# Patient Record
Sex: MALE | Race: WHITE | Employment: OTHER | URBAN - METROPOLITAN AREA
[De-identification: names, ages, dates, MRNs, and addresses within clinical notes are randomized per-mention and may not be internally consistent; named-entity substitution may affect disease eponyms.]

---

## 2017-01-04 PROCEDURE — 88305 TISSUE EXAM BY PATHOLOGIST: CPT | Performed by: UROLOGY

## 2017-01-11 PROBLEM — C61 PROSTATE CANCER (HCC): Status: ACTIVE | Noted: 2017-01-11

## 2017-01-19 RX ORDER — GLIMEPIRIDE 4 MG/1
4 TABLET ORAL
COMMUNITY
End: 2017-02-27

## 2017-01-26 PROBLEM — I77.9 LEFT-SIDED CAROTID ARTERY DISEASE (HCC): Status: ACTIVE | Noted: 2017-01-26

## 2017-01-26 PROBLEM — I25.810 CORONARY ARTERY DISEASE INVOLVING CORONARY BYPASS GRAFT: Status: ACTIVE | Noted: 2017-01-26

## 2017-01-26 PROBLEM — J44.9 COPD WITHOUT EXACERBATION (HCC): Status: ACTIVE | Noted: 2017-01-26

## 2017-01-26 PROBLEM — E11.9 TYPE 2 DIABETES MELLITUS WITHOUT COMPLICATION (HCC): Status: ACTIVE | Noted: 2017-01-26

## 2017-01-31 ENCOUNTER — APPOINTMENT (OUTPATIENT)
Dept: LAB | Facility: HOSPITAL | Age: 67
End: 2017-01-31
Payer: MEDICARE

## 2017-01-31 ENCOUNTER — LAB ENCOUNTER (OUTPATIENT)
Dept: LAB | Facility: HOSPITAL | Age: 67
End: 2017-01-31
Payer: MEDICARE

## 2017-01-31 DIAGNOSIS — C61 PROSTATE CANCER (HCC): ICD-10-CM

## 2017-01-31 LAB
ANTIBODY SCREEN: NEGATIVE
ATRIAL RATE: 51 BPM
BASOPHILS # BLD AUTO: 0.08 X10(3) UL (ref 0–0.1)
BASOPHILS NFR BLD AUTO: 1.1 %
BUN BLD-MCNC: 12 MG/DL (ref 8–20)
CALCIUM BLD-MCNC: 8.7 MG/DL (ref 8.3–10.3)
CHLORIDE: 103 MMOL/L (ref 101–111)
CO2: 30 MMOL/L (ref 22–32)
CREAT BLD-MCNC: 0.94 MG/DL (ref 0.7–1.3)
EOSINOPHIL # BLD AUTO: 0.41 X10(3) UL (ref 0–0.3)
EOSINOPHIL NFR BLD AUTO: 5.7 %
ERYTHROCYTE [DISTWIDTH] IN BLOOD BY AUTOMATED COUNT: 12.7 % (ref 11.5–16)
GLUCOSE BLD-MCNC: 194 MG/DL (ref 70–99)
HCT VFR BLD AUTO: 40.8 % (ref 37–53)
HGB BLD-MCNC: 14.3 G/DL (ref 13–17)
IMMATURE GRANULOCYTE COUNT: 0.05 X10(3) UL (ref 0–1)
IMMATURE GRANULOCYTE RATIO %: 0.7 %
LYMPHOCYTES # BLD AUTO: 2.3 X10(3) UL (ref 0.9–4)
LYMPHOCYTES NFR BLD AUTO: 31.8 %
MCH RBC QN AUTO: 29.9 PG (ref 27–33.2)
MCHC RBC AUTO-ENTMCNC: 35 G/DL (ref 31–37)
MCV RBC AUTO: 85.4 FL (ref 80–99)
MONOCYTES # BLD AUTO: 0.77 X10(3) UL (ref 0.1–0.6)
MONOCYTES NFR BLD AUTO: 10.6 %
NEUTROPHIL ABS PRELIM: 3.63 X10 (3) UL (ref 1.3–6.7)
NEUTROPHILS # BLD AUTO: 3.63 X10(3) UL (ref 1.3–6.7)
NEUTROPHILS NFR BLD AUTO: 50.1 %
P AXIS: 54 DEGREES
P-R INTERVAL: 168 MS
PLATELET # BLD AUTO: 242 10(3)UL (ref 150–450)
POTASSIUM SERPL-SCNC: 4.4 MMOL/L (ref 3.6–5.1)
Q-T INTERVAL: 466 MS
QRS DURATION: 96 MS
QTC CALCULATION (BEZET): 429 MS
R AXIS: -23 DEGREES
RBC # BLD AUTO: 4.78 X10(6)UL (ref 3.8–5.8)
RED CELL DISTRIBUTION WIDTH-SD: 39.3 FL (ref 35.1–46.3)
RH BLOOD TYPE: NEGATIVE
SODIUM SERPL-SCNC: 140 MMOL/L (ref 136–144)
T AXIS: -36 DEGREES
VENTRICULAR RATE: 51 BPM
WBC # BLD AUTO: 7.2 X10(3) UL (ref 4–13)

## 2017-01-31 PROCEDURE — 93005 ELECTROCARDIOGRAM TRACING: CPT

## 2017-01-31 PROCEDURE — 86901 BLOOD TYPING SEROLOGIC RH(D): CPT

## 2017-01-31 PROCEDURE — 86850 RBC ANTIBODY SCREEN: CPT

## 2017-01-31 PROCEDURE — 36415 COLL VENOUS BLD VENIPUNCTURE: CPT

## 2017-01-31 PROCEDURE — 80048 BASIC METABOLIC PNL TOTAL CA: CPT

## 2017-01-31 PROCEDURE — 93010 ELECTROCARDIOGRAM REPORT: CPT | Performed by: INTERNAL MEDICINE

## 2017-01-31 PROCEDURE — 86900 BLOOD TYPING SEROLOGIC ABO: CPT

## 2017-01-31 PROCEDURE — 85025 COMPLETE CBC W/AUTO DIFF WBC: CPT

## 2017-02-16 ENCOUNTER — ANESTHESIA EVENT (OUTPATIENT)
Dept: SURGERY | Facility: HOSPITAL | Age: 67
DRG: 707 | End: 2017-02-16
Payer: MEDICARE

## 2017-02-17 ENCOUNTER — SURGERY (OUTPATIENT)
Age: 67
End: 2017-02-17

## 2017-02-17 ENCOUNTER — ANESTHESIA (OUTPATIENT)
Dept: SURGERY | Facility: HOSPITAL | Age: 67
DRG: 707 | End: 2017-02-17
Payer: MEDICARE

## 2017-02-17 ENCOUNTER — HOSPITAL ENCOUNTER (INPATIENT)
Facility: HOSPITAL | Age: 67
LOS: 2 days | Discharge: HOME | DRG: 707 | End: 2017-02-19
Attending: UROLOGY | Admitting: UROLOGY
Payer: MEDICARE

## 2017-02-17 DIAGNOSIS — C61 PROSTATE CANCER (HCC): Primary | ICD-10-CM

## 2017-02-17 LAB
ERYTHROCYTE [DISTWIDTH] IN BLOOD BY AUTOMATED COUNT: 12.9 % (ref 11.5–16)
GLUCOSE BLD-MCNC: 195 MG/DL (ref 65–99)
GLUCOSE BLD-MCNC: 261 MG/DL (ref 65–99)
GLUCOSE BLD-MCNC: 283 MG/DL (ref 65–99)
GLUCOSE BLD-MCNC: 283 MG/DL (ref 65–99)
GLUCOSE BLD-MCNC: 287 MG/DL (ref 65–99)
HCT VFR BLD AUTO: 36.7 % (ref 37–53)
HGB BLD-MCNC: 12.7 G/DL (ref 13–17)
MCH RBC QN AUTO: 30 PG (ref 27–33.2)
MCHC RBC AUTO-ENTMCNC: 34.6 G/DL (ref 31–37)
MCV RBC AUTO: 86.6 FL (ref 80–99)
PLATELET # BLD AUTO: 221 10(3)UL (ref 150–450)
RBC # BLD AUTO: 4.24 X10(6)UL (ref 3.8–5.8)
RED CELL DISTRIBUTION WIDTH-SD: 40 FL (ref 35.1–46.3)
WBC # BLD AUTO: 15 X10(3) UL (ref 4–13)

## 2017-02-17 PROCEDURE — 0VBQ4ZZ EXCISION OF BILATERAL VAS DEFERENS, PERCUTANEOUS ENDOSCOPIC APPROACH: ICD-10-PCS | Performed by: UROLOGY

## 2017-02-17 PROCEDURE — 8E0W4CZ ROBOTIC ASSISTED PROCEDURE OF TRUNK REGION, PERCUTANEOUS ENDOSCOPIC APPROACH: ICD-10-PCS | Performed by: UROLOGY

## 2017-02-17 PROCEDURE — 88307 TISSUE EXAM BY PATHOLOGIST: CPT | Performed by: UROLOGY

## 2017-02-17 PROCEDURE — 0VT04ZZ RESECTION OF PROSTATE, PERCUTANEOUS ENDOSCOPIC APPROACH: ICD-10-PCS | Performed by: UROLOGY

## 2017-02-17 PROCEDURE — 88305 TISSUE EXAM BY PATHOLOGIST: CPT | Performed by: UROLOGY

## 2017-02-17 PROCEDURE — 0VT34ZZ RESECTION OF BILATERAL SEMINAL VESICLES, PERCUTANEOUS ENDOSCOPIC APPROACH: ICD-10-PCS | Performed by: UROLOGY

## 2017-02-17 PROCEDURE — 07BC4ZX EXCISION OF PELVIS LYMPHATIC, PERCUTANEOUS ENDOSCOPIC APPROACH, DIAGNOSTIC: ICD-10-PCS | Performed by: UROLOGY

## 2017-02-17 PROCEDURE — 82962 GLUCOSE BLOOD TEST: CPT

## 2017-02-17 PROCEDURE — 88309 TISSUE EXAM BY PATHOLOGIST: CPT | Performed by: UROLOGY

## 2017-02-17 PROCEDURE — 85027 COMPLETE CBC AUTOMATED: CPT | Performed by: UROLOGY

## 2017-02-17 DEVICE — SURGIFLO HEMOSTATIC MATRIX KIT: Type: IMPLANTABLE DEVICE | Site: PELVIS | Status: FUNCTIONAL

## 2017-02-17 RX ORDER — SODIUM CHLORIDE, SODIUM LACTATE, POTASSIUM CHLORIDE, CALCIUM CHLORIDE 600; 310; 30; 20 MG/100ML; MG/100ML; MG/100ML; MG/100ML
INJECTION, SOLUTION INTRAVENOUS CONTINUOUS
Status: DISCONTINUED | OUTPATIENT
Start: 2017-02-17 | End: 2017-02-17 | Stop reason: HOSPADM

## 2017-02-17 RX ORDER — POLYETHYLENE GLYCOL 3350 17 G/17G
17 POWDER, FOR SOLUTION ORAL DAILY PRN
Status: DISCONTINUED | OUTPATIENT
Start: 2017-02-17 | End: 2017-02-19

## 2017-02-17 RX ORDER — ATENOLOL 50 MG/1
50 TABLET ORAL
Status: DISCONTINUED | OUTPATIENT
Start: 2017-02-18 | End: 2017-02-19

## 2017-02-17 RX ORDER — INSULIN ASPART 100 [IU]/ML
INJECTION, SOLUTION INTRAVENOUS; SUBCUTANEOUS
Status: COMPLETED
Start: 2017-02-17 | End: 2017-02-17

## 2017-02-17 RX ORDER — BUPIVACAINE HYDROCHLORIDE 5 MG/ML
INJECTION, SOLUTION EPIDURAL; INTRACAUDAL AS NEEDED
Status: DISCONTINUED | OUTPATIENT
Start: 2017-02-17 | End: 2017-02-17 | Stop reason: HOSPADM

## 2017-02-17 RX ORDER — SODIUM CHLORIDE, SODIUM LACTATE, POTASSIUM CHLORIDE, CALCIUM CHLORIDE 600; 310; 30; 20 MG/100ML; MG/100ML; MG/100ML; MG/100ML
INJECTION, SOLUTION INTRAVENOUS CONTINUOUS
Status: DISCONTINUED | OUTPATIENT
Start: 2017-02-17 | End: 2017-02-17

## 2017-02-17 RX ORDER — METOCLOPRAMIDE HYDROCHLORIDE 5 MG/ML
10 INJECTION INTRAMUSCULAR; INTRAVENOUS AS NEEDED
Status: DISCONTINUED | OUTPATIENT
Start: 2017-02-17 | End: 2017-02-17 | Stop reason: HOSPADM

## 2017-02-17 RX ORDER — ONDANSETRON 2 MG/ML
4 INJECTION INTRAMUSCULAR; INTRAVENOUS AS NEEDED
Status: DISCONTINUED | OUTPATIENT
Start: 2017-02-17 | End: 2017-02-17 | Stop reason: HOSPADM

## 2017-02-17 RX ORDER — ROSUVASTATIN CALCIUM 20 MG/1
20 TABLET, COATED ORAL NIGHTLY
Status: DISCONTINUED | OUTPATIENT
Start: 2017-02-17 | End: 2017-02-19

## 2017-02-17 RX ORDER — ZOLPIDEM TARTRATE 5 MG/1
5 TABLET ORAL NIGHTLY PRN
Status: DISCONTINUED | OUTPATIENT
Start: 2017-02-17 | End: 2017-02-19

## 2017-02-17 RX ORDER — ONDANSETRON 2 MG/ML
4 INJECTION INTRAMUSCULAR; INTRAVENOUS EVERY 6 HOURS PRN
Status: DISCONTINUED | OUTPATIENT
Start: 2017-02-17 | End: 2017-02-19

## 2017-02-17 RX ORDER — GLIPIZIDE 5 MG/1
10 TABLET ORAL
Status: DISCONTINUED | OUTPATIENT
Start: 2017-02-17 | End: 2017-02-17

## 2017-02-17 RX ORDER — HYDROCODONE BITARTRATE AND ACETAMINOPHEN 5; 325 MG/1; MG/1
2 TABLET ORAL EVERY 4 HOURS PRN
Status: DISCONTINUED | OUTPATIENT
Start: 2017-02-17 | End: 2017-02-19

## 2017-02-17 RX ORDER — HYDROMORPHONE HYDROCHLORIDE 1 MG/ML
INJECTION, SOLUTION INTRAMUSCULAR; INTRAVENOUS; SUBCUTANEOUS
Status: COMPLETED
Start: 2017-02-17 | End: 2017-02-17

## 2017-02-17 RX ORDER — DOCUSATE SODIUM 100 MG/1
100 CAPSULE, LIQUID FILLED ORAL 2 TIMES DAILY
Status: DISCONTINUED | OUTPATIENT
Start: 2017-02-17 | End: 2017-02-19

## 2017-02-17 RX ORDER — MEPERIDINE HYDROCHLORIDE 25 MG/ML
12.5 INJECTION INTRAMUSCULAR; INTRAVENOUS; SUBCUTANEOUS AS NEEDED
Status: DISCONTINUED | OUTPATIENT
Start: 2017-02-17 | End: 2017-02-17 | Stop reason: HOSPADM

## 2017-02-17 RX ORDER — SODIUM CHLORIDE 9 MG/ML
INJECTION, SOLUTION INTRAVENOUS CONTINUOUS
Status: DISCONTINUED | OUTPATIENT
Start: 2017-02-17 | End: 2017-02-19

## 2017-02-17 RX ORDER — HYDROMORPHONE HYDROCHLORIDE 1 MG/ML
0.2 INJECTION, SOLUTION INTRAMUSCULAR; INTRAVENOUS; SUBCUTANEOUS EVERY 2 HOUR PRN
Status: DISCONTINUED | OUTPATIENT
Start: 2017-02-17 | End: 2017-02-19

## 2017-02-17 RX ORDER — ACETAMINOPHEN 325 MG/1
650 TABLET ORAL EVERY 4 HOURS PRN
Status: DISCONTINUED | OUTPATIENT
Start: 2017-02-17 | End: 2017-02-19

## 2017-02-17 RX ORDER — MAGNESIUM HYDROXIDE 1200 MG/15ML
LIQUID ORAL CONTINUOUS PRN
Status: DISCONTINUED | OUTPATIENT
Start: 2017-02-17 | End: 2017-02-17

## 2017-02-17 RX ORDER — HYDROMORPHONE HYDROCHLORIDE 1 MG/ML
0.4 INJECTION, SOLUTION INTRAMUSCULAR; INTRAVENOUS; SUBCUTANEOUS EVERY 5 MIN PRN
Status: DISCONTINUED | OUTPATIENT
Start: 2017-02-17 | End: 2017-02-17 | Stop reason: HOSPADM

## 2017-02-17 RX ORDER — INSULIN ASPART 100 [IU]/ML
INJECTION, SOLUTION INTRAVENOUS; SUBCUTANEOUS ONCE
Status: COMPLETED | OUTPATIENT
Start: 2017-02-17 | End: 2017-02-17

## 2017-02-17 RX ORDER — HEPARIN SODIUM 5000 [USP'U]/ML
5000 INJECTION, SOLUTION INTRAVENOUS; SUBCUTANEOUS ONCE
Status: COMPLETED | OUTPATIENT
Start: 2017-02-17 | End: 2017-02-17

## 2017-02-17 RX ORDER — HYDROCODONE BITARTRATE AND ACETAMINOPHEN 5; 325 MG/1; MG/1
1 TABLET ORAL EVERY 4 HOURS PRN
Status: DISCONTINUED | OUTPATIENT
Start: 2017-02-17 | End: 2017-02-19

## 2017-02-17 RX ORDER — ACETAMINOPHEN 325 MG/1
650 TABLET ORAL EVERY 6 HOURS PRN
Status: DISCONTINUED | OUTPATIENT
Start: 2017-02-17 | End: 2017-02-17

## 2017-02-17 RX ORDER — FAMOTIDINE 20 MG/1
20 TABLET ORAL 2 TIMES DAILY
Status: DISCONTINUED | OUTPATIENT
Start: 2017-02-17 | End: 2017-02-19

## 2017-02-17 RX ORDER — ONDANSETRON 4 MG/1
4 TABLET, FILM COATED ORAL EVERY 6 HOURS PRN
Status: DISCONTINUED | OUTPATIENT
Start: 2017-02-17 | End: 2017-02-19

## 2017-02-17 RX ORDER — NALOXONE HYDROCHLORIDE 0.4 MG/ML
80 INJECTION, SOLUTION INTRAMUSCULAR; INTRAVENOUS; SUBCUTANEOUS AS NEEDED
Status: DISCONTINUED | OUTPATIENT
Start: 2017-02-17 | End: 2017-02-17 | Stop reason: HOSPADM

## 2017-02-17 RX ORDER — ISOSORBIDE MONONITRATE 30 MG/1
30 TABLET, EXTENDED RELEASE ORAL DAILY
Status: DISCONTINUED | OUTPATIENT
Start: 2017-02-18 | End: 2017-02-19

## 2017-02-17 RX ORDER — HYDROMORPHONE HYDROCHLORIDE 1 MG/ML
0.8 INJECTION, SOLUTION INTRAMUSCULAR; INTRAVENOUS; SUBCUTANEOUS EVERY 2 HOUR PRN
Status: DISCONTINUED | OUTPATIENT
Start: 2017-02-17 | End: 2017-02-19

## 2017-02-17 RX ORDER — MIDAZOLAM HYDROCHLORIDE 1 MG/ML
1 INJECTION INTRAMUSCULAR; INTRAVENOUS EVERY 5 MIN PRN
Status: DISCONTINUED | OUTPATIENT
Start: 2017-02-17 | End: 2017-02-17 | Stop reason: HOSPADM

## 2017-02-17 RX ORDER — DEXTROSE MONOHYDRATE 25 G/50ML
50 INJECTION, SOLUTION INTRAVENOUS
Status: DISCONTINUED | OUTPATIENT
Start: 2017-02-17 | End: 2017-02-19

## 2017-02-17 RX ORDER — DEXTROSE MONOHYDRATE 25 G/50ML
50 INJECTION, SOLUTION INTRAVENOUS
Status: DISCONTINUED | OUTPATIENT
Start: 2017-02-17 | End: 2017-02-17 | Stop reason: HOSPADM

## 2017-02-17 RX ORDER — ENOXAPARIN SODIUM 100 MG/ML
40 INJECTION SUBCUTANEOUS DAILY
Status: DISCONTINUED | OUTPATIENT
Start: 2017-02-17 | End: 2017-02-19

## 2017-02-17 RX ORDER — GLIMEPIRIDE 2 MG/1
4 TABLET ORAL
Status: DISCONTINUED | OUTPATIENT
Start: 2017-02-18 | End: 2017-02-17

## 2017-02-17 RX ORDER — HYDROMORPHONE HYDROCHLORIDE 1 MG/ML
0.4 INJECTION, SOLUTION INTRAMUSCULAR; INTRAVENOUS; SUBCUTANEOUS EVERY 2 HOUR PRN
Status: DISCONTINUED | OUTPATIENT
Start: 2017-02-17 | End: 2017-02-19

## 2017-02-17 NOTE — ANESTHESIA POSTPROCEDURE EVALUATION
[id number] Patient Status:  Surgery Admit   Age/Gender 78year old male MRN [id number]   Location [facility] Attending Tian Hitchcock MD   Hosp Day # 0 PCP Bonita Marcos MD

## 2017-02-17 NOTE — ANESTHESIA PREPROCEDURE EVALUATION
Late entry due to patient care and phone calls between consultants.     PRE-OP EVALUATION    Patient Name: Sylvester Ruby    Pre-op Diagnosis: PROSTATE CANCER      Procedure(s):  ROBOTIC ASSISTED LAPAROSCOPIC RADICAL PROSTATECTOMY WITH PELVIC LYM Tablet 24 Hr  Disp:  Rfl:    [DISCONTINUED] MetFORMIN HCl 500 MG Oral Tab Take 1,000 mg by mouth 2 (two) times daily with meals. Disp:  Rfl:        Allergies: Review of patient's allergies indicates no known allergies.       Anesthesia Evaluation    Patie x 5     CABG  1992    Comment CABG x 4    OTHER      Comment Left Carotid Endarectomy    HERNIA SURGERY          Smoking status: Former Smoker  1.00 Packs/Day  For 30.00 Years     Quit date: 12/28/2011    Smokeless tobacco: Never Used    Alcohol Use: Yes Discussed risk of oral/dental trauma, nausea, sore throat, rare allergic reactions. Other options for anesthesia were mentioned and a plan was agreed upon for general anesthesia.   Patient understands the risks and requirement for anesthesia and agrees to

## 2017-02-17 NOTE — PLAN OF CARE
Problem: GENITOURINARY - ADULT  Goal: Absence of urinary retention  INTERVENTIONS:  - Assess patient’s ability to void and empty bladder  - Monitor intake/output and perform bladder scan as needed  - Follow urinary retention protocol/standard of care  - Co

## 2017-02-17 NOTE — CONSULTS
General Medicine Consult      Reason for consult: post-op medical management     Consulted by: Dr. Winifred Chun    PCP: Tejas Paul MD      History of Present Illness: Patient is a 78year old male with PMH sig for DMII, HTN, HL, CVA, prostate CA meals.   Disp:  Rfl:        Scheduled Medication:  • [START ON 2/18/2017] glimepiride  4 mg Oral QAM AC   • [START ON 2/18/2017] atenolol  50 mg Oral Daily Beta Blocker   • glipiZIDE  10 mg Oral BID AC   • SITagliptin Phosphate  100 mg Oral Daily   • [STAR +BS  Ext: nonedematous b/l LE   Neuro: moving all extremities  Psych: normal mood, normal affect  Skin: no rashes, no lesions      Diagnostics:   CBC/Chem  Recent Labs   Lab  02/17/17   1134   WBC  15.0*   HGB  12.7*   MCV  86.6   PLT  221.0       No resul INTIMA-MEDIA THICKNESS = 1 mm Comment: Physiologic color flow and doppler waveforms. There is mild calcified hyperechoic atherosclerotic plaque seen measuring 6 mm. No grayscale evidence of luminal stenosis.    VERTEBRAL ARTERIES Direction of flow on right: [phone]      **Certification        PHYSICIAN Certification of Need for Inpatient Hospitalization - Initial Certification      Patient will require inpatient services that will reasonably be expected to span two midnight's based on the clinical docume

## 2017-02-17 NOTE — OPERATIVE REPORT
[facility]rology       Operative Note    Ace Damme Patient Status:  Surgery Admit    9/15/1950 MRN [id number]   Location [facility] Attending Komal Patel MD   Hosp Day # 0 PCP Margret Villalobos procedure were identified in the presence of Nursing, Anesthesia, and surgical staff. After the placement of lines and monitors, general anesthesia was induced. Prophylactic antibiotics were administered.   Sequential compressive devices were placed on mary posterior bladder neck fibers down to the level of the vasa deferentia and seminal vesicles. The vasa deferentia were freed from their surrounding attachments through blunt and sharp dissection and were divided after control with the bipolar cautery.   The proximal limit of dissection, monopolar cautery was used to control the lymphatic vessels. Both the right and left lymph node packages were then placed in an EndoCatch bag for removal at the end of the procedure.     We then performed urethrovesical anasto

## 2017-02-17 NOTE — PROGRESS NOTES
New orders noted from dr Lesvia Jim:  'Please ask urology if its ok to resume plavix in AM (pt has h/o CAD, CVA)'

## 2017-02-17 NOTE — H&P
[facility] Urology  H&P Note    Cande Cat Patient Status:  Surgery Admit    9/15/1950 MRN [id number]   Location [address] [place] [address] Attending Patricia Dennis MD   [facility] Day # 0 PCP Rico Weller **OR** glucose (DEX4) oral liquid 30 g, 30 g, Oral, Q15 Min PRN **OR** Glucose-Vitamin C (DEX-4) 4-0.006 g chewable tab 8 tablet, 8 tablet, Oral, Q15 Min PRN  •  Heparin Sodium (Porcine) 5000 UNIT/ML injection 5,000 Units, 5,000 Units, Subcutaneous, Once

## 2017-02-18 LAB
BUN BLD-MCNC: 7 MG/DL (ref 8–20)
CALCIUM BLD-MCNC: 7.2 MG/DL (ref 8.3–10.3)
CHLORIDE: 107 MMOL/L (ref 101–111)
CO2: 25 MMOL/L (ref 22–32)
CREAT BLD-MCNC: 0.87 MG/DL (ref 0.7–1.3)
CREATININE, OTHER BODY FLUID: 0.86 MG/DL
ERYTHROCYTE [DISTWIDTH] IN BLOOD BY AUTOMATED COUNT: 13 % (ref 11.5–16)
GLUCOSE BLD-MCNC: 163 MG/DL (ref 70–99)
GLUCOSE BLD-MCNC: 166 MG/DL (ref 65–99)
GLUCOSE BLD-MCNC: 184 MG/DL (ref 65–99)
GLUCOSE BLD-MCNC: 207 MG/DL (ref 65–99)
GLUCOSE BLD-MCNC: 219 MG/DL (ref 65–99)
GLUCOSE BLD-MCNC: 234 MG/DL (ref 65–99)
HCT VFR BLD AUTO: 30.8 % (ref 37–53)
HGB BLD-MCNC: 10.5 G/DL (ref 13–17)
MCH RBC QN AUTO: 29.7 PG (ref 27–33.2)
MCHC RBC AUTO-ENTMCNC: 34.1 G/DL (ref 31–37)
MCV RBC AUTO: 87 FL (ref 80–99)
PLATELET # BLD AUTO: 185 10(3)UL (ref 150–450)
POTASSIUM SERPL-SCNC: 3.6 MMOL/L (ref 3.6–5.1)
POTASSIUM SERPL-SCNC: 4.5 MMOL/L (ref 3.6–5.1)
RBC # BLD AUTO: 3.54 X10(6)UL (ref 3.8–5.8)
RED CELL DISTRIBUTION WIDTH-SD: 41 FL (ref 35.1–46.3)
SODIUM SERPL-SCNC: 140 MMOL/L (ref 136–144)
WBC # BLD AUTO: 8.7 X10(3) UL (ref 4–13)

## 2017-02-18 PROCEDURE — 82570 ASSAY OF URINE CREATININE: CPT | Performed by: UROLOGY

## 2017-02-18 PROCEDURE — 82962 GLUCOSE BLOOD TEST: CPT

## 2017-02-18 PROCEDURE — 80048 BASIC METABOLIC PNL TOTAL CA: CPT | Performed by: UROLOGY

## 2017-02-18 PROCEDURE — 85027 COMPLETE CBC AUTOMATED: CPT | Performed by: UROLOGY

## 2017-02-18 PROCEDURE — 84132 ASSAY OF SERUM POTASSIUM: CPT | Performed by: INTERNAL MEDICINE

## 2017-02-18 RX ORDER — POTASSIUM CHLORIDE 20 MEQ/1
40 TABLET, EXTENDED RELEASE ORAL EVERY 4 HOURS
Status: COMPLETED | OUTPATIENT
Start: 2017-02-18 | End: 2017-02-18

## 2017-02-18 NOTE — PLAN OF CARE
PT A/O, 93% ON RA, SCDS ON, LAP SITES X6, D/I, ELLIOTT X1 DRAINING SEROUSANGUINOUS DRAINAGE, MINOR DRAINING CL YELLOW URINE, TEMP 100.9, GIVEN NORCO, IVF INFUISNG , LABS THIS AM. USING IS.  GENITOURINARY - ADULT    • Absence of urinary retention Progressing

## 2017-02-18 NOTE — PROGRESS NOTES
[facility]  Urology Progress Note    Watson De La Fuente Patient Status:  Inpatient    9/15/1950 MRN [id number]   [facility]NW-A Attending Star Beltran MD   Hosp Day # 1 PCP Ju Huerta MD     Assessment: Posto

## 2017-02-18 NOTE — PAYOR COMM NOTE
Attending Physician: Star Beltran MD    Review Type: ADMISSION   Reviewer: Josué Hart       Date: February 18, 2017 - 12:11 PM  Payor: Joanne Lilly MEDICARE ADV PPO  Authorization Number: [id number]/A  Admit date: 2/17/2017  6:13 AM   Admitted from Emergency Dept. 2/18/2017 0919 Given 40 mg Subcutaneous (Right Upper Abdomen) Sabas Anna RN    2/17/2017 2153 Given 40 mg Subcutaneous (Left Lower Abdomen) Pan Mcginnis, RN      famoTIDine (PEPCID) tab 20 mg     Date Action Dose Route User    2/18/2017 0916 Gi 2/17/2017 1904 Given 4 Units Subcutaneous (Right Upper Arm) Fawadsilviano Young, RN      Potassium Chloride ER (K-DUR M20) CR tab 40 mEq     Date Action Dose Route User    2/18/2017 0915 Given 40 mEq Oral Fawadsilviano Young, RN      Rosuvastatin Calcium (CREST POCT GLUCOSE - Abnormal; Notable for the following:     POC Glucose 166 (*)     All other components within normal limits   POCT GLUCOSE - Abnormal; Notable for the following:     POC Glucose 184 (*)     All other components within normal limits   POCT GLU

## 2017-02-18 NOTE — PROGRESS NOTES
Spoke with dr Joesph Jay. Informed of arcelia drain creat levels. New orders noted to d/c drain tonight.   md states plan d/c home tomorrow

## 2017-02-18 NOTE — PLAN OF CARE
Problem: Patient/Family Goals  Goal: Patient/Family Short Term Goal  Patient’s Short Term Goal: 'i want to get better'    Interventions:   - norco given as ordered  - See additional Care Plan goals for specific interventions   Outcome: Progressing  Pt stat new pain   Outcome: Progressing  Pt states pain is currently tolerable    Problem: Diabetes/Glucose Control  Goal: Glucose maintained within prescribed range  INTERVENTIONS:  - Monitor Blood Glucose as ordered  - Assess for signs and symptoms of hyperglyce

## 2017-02-18 NOTE — PROGRESS NOTES
[facility] Hospitalist Progress Note                                                                   [address]  9/15/1950    SUBJECTIVE: pt denies cp, sob and cough.  Pain controll Glucose-Vitamin C **OR** dextrose **OR** glucose **OR** Glucose-Vitamin C    Assessment/Plan:  Active Problems:    [facility]        # Prostate CA s/p prostatectomy  - per primary service    #Fever  -monitor for evidence of infection    # DMII,

## 2017-02-18 NOTE — PROGRESS NOTES
02/17/17 1828   Clinical Encounter Type   Visited With Patient not available  (RN checked with patient who says he does not want a  visit at this time)

## 2017-02-19 VITALS
HEIGHT: 65 IN | BODY MASS INDEX: 25.83 KG/M2 | DIASTOLIC BLOOD PRESSURE: 68 MMHG | SYSTOLIC BLOOD PRESSURE: 101 MMHG | HEART RATE: 68 BPM | TEMPERATURE: 99 F | WEIGHT: 155 LBS | RESPIRATION RATE: 18 BRPM | OXYGEN SATURATION: 97 %

## 2017-02-19 LAB
ERYTHROCYTE [DISTWIDTH] IN BLOOD BY AUTOMATED COUNT: 12.8 % (ref 11.5–16)
GLUCOSE BLD-MCNC: 146 MG/DL (ref 65–99)
GLUCOSE BLD-MCNC: 221 MG/DL (ref 65–99)
HCT VFR BLD AUTO: 26.2 % (ref 37–53)
HGB BLD-MCNC: 9 G/DL (ref 13–17)
HGB BLD-MCNC: 9.9 G/DL (ref 13–17)
MCH RBC QN AUTO: 29.7 PG (ref 27–33.2)
MCHC RBC AUTO-ENTMCNC: 34.4 G/DL (ref 31–37)
MCV RBC AUTO: 86.5 FL (ref 80–99)
PLATELET # BLD AUTO: 188 10(3)UL (ref 150–450)
RBC # BLD AUTO: 3.03 X10(6)UL (ref 3.8–5.8)
RED CELL DISTRIBUTION WIDTH-SD: 40.3 FL (ref 35.1–46.3)
WBC # BLD AUTO: 8.3 X10(3) UL (ref 4–13)

## 2017-02-19 PROCEDURE — 85027 COMPLETE CBC AUTOMATED: CPT | Performed by: UROLOGY

## 2017-02-19 PROCEDURE — 85018 HEMOGLOBIN: CPT | Performed by: UROLOGY

## 2017-02-19 PROCEDURE — 82962 GLUCOSE BLOOD TEST: CPT

## 2017-02-19 RX ORDER — METOCLOPRAMIDE 10 MG/1
10 TABLET ORAL ONCE
Status: COMPLETED | OUTPATIENT
Start: 2017-02-19 | End: 2017-02-19

## 2017-02-19 RX ORDER — METOCLOPRAMIDE 10 MG/1
10 TABLET ORAL
Qty: 6 TABLET | Refills: 0 | Status: SHIPPED | OUTPATIENT
Start: 2017-02-19 | End: 2017-02-21

## 2017-02-19 RX ORDER — BISACODYL 10 MG
10 SUPPOSITORY, RECTAL RECTAL ONCE
Status: COMPLETED | OUTPATIENT
Start: 2017-02-19 | End: 2017-02-19

## 2017-02-19 NOTE — PROGRESS NOTES
[facility]  Urology Progress Note    Travis Quiñonez Patient Status:  Inpatient    9/15/1950 MRN [id number]   [facility] 3NW-A Attending Hany Tiwari MD   Hosp Day # 2 PCP Michelle Etienne MD     Assessment: Posto

## 2017-02-19 NOTE — PLAN OF CARE
Pt and pt's wife given all discharge instructions and necessary supplies. ELLIOTT site dressing changed just before DC; old dressing noted to be saturated with serosanguinous drainage. Hills bag switched to leg bag.   Pt and pt's wife stated understanding and

## 2017-02-19 NOTE — PROGRESS NOTES
Spoke with dr Brayan Stuart. Informed md of pt's concern that a couple of drips of fluid came from cath insertion site. md states that is to be expected.   Also informed md of cbc results & pt stating that he has not passed gas but did have small very light bm @

## 2017-02-19 NOTE — PROGRESS NOTES
[facility] Hospitalist Progress Note                                                                   [address]  9/15/1950    SUBJECTIVE: Pt trying to use bathroom, no flatus.  NO f acetaminophen **OR** HYDROcodone-acetaminophen **OR** HYDROcodone-acetaminophen, HYDROmorphone HCl PF **OR** HYDROmorphone HCl PF **OR** HYDROmorphone HCl PF, Zolpidem Tartrate, PEG 3350, ondansetron HCl **OR** Ondansetron HCl, CEPACOL, glucose **OR** Gluc

## 2017-02-19 NOTE — PLAN OF CARE
Problem: Patient/Family Goals  Goal: Patient/Family Short Term Goal  Patient’s Short Term Goal: Pt states he would like to d/c tonight    Interventions:   - dulcolax given as ordered.   Cbc ordered for 2pm  - See additional Care Plan goals for specific inte side effects  - Notify MD/LIP if interventions unsuccessful or patient reports new pain   Outcome: Adequate for Discharge  States norco provides adequate pain control    Problem: Diabetes/Glucose Control  Goal: Glucose maintained within prescribed range  I

## 2017-02-19 NOTE — PLAN OF CARE
PAIN - ADULT    • Verbalizes/displays adequate comfort level or patient's stated pain goal Progressing        SKIN/TISSUE INTEGRITY - ADULT    • Incision(s), wounds(s) or drain site(s) healing without S/S of infection Progressing        Upon assessment, A&

## 2017-02-23 NOTE — CDS QUERY
Clinical Significance – Lab Results Associated with Blood Loss  CLINICAL DOCUMENTATION CLARIFICATION FORM  Dear Doctor:  Clinical information (provided below) indicates blood loss and abnormal blood counts.  For accurate ICD-10-CM code assignment to reflect

## 2017-02-27 NOTE — DISCHARGE SUMMARY
[facility]  Discharge Summary    Delia Thompson Patient Status:  Inpatient    9/15/1950 MRN [id number]   [facility] 3NW-A Attending No att. providers found   Hosp Day # 2 PCP Lindsay Motta MD     Date of Admission: Oral Cap  Take 1 capsule (100 mg total) by mouth 2 (two) times daily as needed for constipation. , Normal, Disp-40 capsule, R-1    Ciprofloxacin HCl (CIPRO) 500 MG Oral Tab  Take 1 tablet (500 mg total) by mouth 2 (two) times daily.  Starting the day before

## 2017-02-28 NOTE — PAYOR COMM NOTE
Review Type: CONTINUED STAY  Reviewer: Milan Hammans     Date: February 28, 2017 - 1:33 PM  Payor: Jerry Brar MEDICARE ADV PPO  Authorization Number: N/A  Admit date: 2/17/2017  6:13 AM      Date of Discharge: 2/19/2017  4:35 PM    Admitting Diagnosis: PROS for constipation. , Normal, Disp-40 capsule, R-1    Ciprofloxacin HCl (CIPRO) 500 MG Oral Tab  Take 1 tablet (500 mg total) by mouth 2 (two) times daily.  Starting the day before your catheter removal FEB 26TH, Normal, Disp-6 tablet, R-0      STOP taking the

## (undated) DEVICE — KENDALL SCD EXPRESS SLEEVES, KNEE LENGTH, MEDIUM: Brand: KENDALL SCD

## (undated) DEVICE — ENDOPATH XCEL WITH OPTIVIEW TECHNOLOGY BLADELESS TROCARS WITH STABILITY SLEEVES: Brand: ENDOPATH XCEL OPTIVIEW

## (undated) DEVICE — TROCAR BLADELESS 12MM B12LT

## (undated) DEVICE — TIP COVER ACCESSORY

## (undated) DEVICE — MONOPOLAR CURVED SCISSORS: Brand: ENDOWRIST;DAVINCI SI

## (undated) DEVICE — PROGRASP FORCEPS: Brand: ENDOWRIST;DAVINCI SI

## (undated) DEVICE — SUTURE VICRYL 0 UR-6

## (undated) DEVICE — ANCHOR TISSUE RETRIEVAL SYSTEM, BAG SIZE 175 ML, PORT SIZE 10 MM: Brand: ANCHOR TISSUE RETRIEVAL SYSTEM

## (undated) DEVICE — GAUZE SPONGES,USP TYPE VII GAUZE, 12 PLY: Brand: CURITY

## (undated) DEVICE — SUTURE VLOC 90 3-0 9\" 0644

## (undated) DEVICE — LARGE NEEDLE DRIVER: Brand: ENDOWRIST;DAVINCI SI

## (undated) DEVICE — 40580 - THE PINK PAD - ADVANCED TRENDELENBURG POSITIONING KIT: Brand: 40580 - THE PINK PAD - ADVANCED TRENDELENBURG POSITIONING KIT

## (undated) DEVICE — SUTURE MONOCRYL 4-0 PS-2

## (undated) DEVICE — PAD SACRAL SPAN AID

## (undated) DEVICE — DV KIT ACCESSORY 4-ARM

## (undated) DEVICE — SURGICEL SNOW ABS 4X4

## (undated) DEVICE — SUTURE SILK 0

## (undated) DEVICE — SOL H2O 1000ML BTL

## (undated) DEVICE — DV OBTURATOR BLADELESS 8MM

## (undated) DEVICE — SUTURE ETHILON 2-0 FS

## (undated) DEVICE — 2, DISPOSABLE SUCTION/IRRIGATOR WITHOUT DISPOSABLE TIP: Brand: STRYKEFLOW

## (undated) DEVICE — DERMABOND LIQUID ADHESIVE

## (undated) DEVICE — ROBOTIC UROLOGY PROSTATE: Brand: MEDLINE INDUSTRIES, INC.

## (undated) DEVICE — ELECTRO LUBE IS A SINGLE PATIENT USE DEVICE THAT IS INTENDED TO BE USED ON ELECTROSURGICAL ELECTRODES TO REDUCE STICKING.: Brand: KEY SURGICAL ELECTRO LUBE

## (undated) DEVICE — SUTURE VLOC 180 3-0 9\" 0344

## (undated) DEVICE — DRAIN RELIAVAC 100CC

## (undated) DEVICE — SOL  .9 1000ML BAG

## (undated) DEVICE — ENDOPATH XCEL UNIVERSAL TROCAR STABLILITY SLEEVES: Brand: ENDOPATH XCEL

## (undated) DEVICE — FENESTRATED BIPOLAR FORCEPS: Brand: ENDOWRIST;DAVINCI SI

## (undated) DEVICE — DRAIN BLAKE ROUND 15FR

## (undated) DEVICE — SUTURE VLOC 90 2-0 9\" 2145

## (undated) DEVICE — STERILE POLYISOPRENE POWDER-FREE SURGICAL GLOVES: Brand: PROTEXIS

## (undated) DEVICE — CLIP HEMOLOK LARGE PURPLE

## (undated) DEVICE — BIPOLAR FORCEPS CORD,BANANA LEADS: Brand: VALLEYLAB

## (undated) NOTE — IP AVS SNAPSHOT
[facility] [place] [address] [place], [address] ~ [phone]                Discharge Summary   2/17/2017    Delia Thompson           Admission Information        Provider Department    2/17/2017 Maria T Procotr MD  3 Commonly known as:  [facility]        Take 4 mg by mouth every morning before breakfast.         Continue your home routine                   glipiZIDE 10 MG Tabs   Last time this was given:  10 mg on 2/17/2017  4:50 PM   Commonly known as:  Emmy Fothergill Please  your prescriptions at the location directed by your doctor or nurse     Bring a paper prescription for each of these medications    - Metoclopramide HCl 10 MG Tabs              Patient Instructions       No vigorous activity for or lifting o for night time use or when lying down    · The drainage bag can be used for up to a month          It has been a pleasure taking care of you! Best wishes for a speedy recovery!   Rosa Maria RN          Discharge References/Attachments     INDWELLING URINARY Terell Flannery 86.6 (02/17/17)  30.0 (02/17/17)  34.6  (02/17/17)  221.0       Recent Hematology Lab Results (cont.)  (Last 3 results in the past 90 days)    Neutrophil % Lymphocyte % Monocyte % Eosinophil % Basophil % Prelim Neut Abs Final Neut Abs Lymphocyte Abso [place] Medicaid plans. To get signed up and covered, please call [phone] and ask to get set up for an insurance coverage that is in-network with [facility]. Shanice     Sign up for [employer], your secure online medical record.   [employer] Blood Pressure and Cardiac Medications     atenolol 50 MG Oral Tab    Isosorbide Mononitrate ER 30 MG Oral Tablet 24 Hr         Use: Treat abnormal blood pressure (high or low), cardiac conditions; and/or abnormal heart rates/rhythms   Most common side eff